# Patient Record
Sex: MALE | Race: WHITE | NOT HISPANIC OR LATINO | Employment: FULL TIME | URBAN - METROPOLITAN AREA
[De-identification: names, ages, dates, MRNs, and addresses within clinical notes are randomized per-mention and may not be internally consistent; named-entity substitution may affect disease eponyms.]

---

## 2022-07-22 PROBLEM — S83.242A ACUTE MEDIAL MENISCUS TEAR OF LEFT KNEE: Status: ACTIVE | Noted: 2022-07-22

## 2022-09-20 ENCOUNTER — PRE-ADMISSION TESTING (OUTPATIENT)
Dept: ADMISSIONS | Facility: MEDICAL CENTER | Age: 42
End: 2022-09-20
Attending: ORTHOPAEDIC SURGERY
Payer: COMMERCIAL

## 2022-09-20 VITALS — HEIGHT: 74 IN | BODY MASS INDEX: 29.53 KG/M2

## 2022-09-20 NOTE — PREPROCEDURE INSTRUCTIONS
"Preadmit appointment: \" Preparing for your Procedure information\" sheet given to patient with verbal and written instructions. Patient instructed to continue prescribed medications through the day before surgery, instructed to take the following medications the day of surgery per anesthesia protocol:  Tylenol PRN    Pt states, \"no issues with anesthesia\".  Anesthesia Fasting guidelines handout given to Pt, NPO at Midnight, prior to surgery and clear liquids up to 3 hours prior to surgery then strict NPO until surgery, clear liquids defined for Pt.        All Pt's questions and concerns answered or addressed.   "

## 2022-10-02 ENCOUNTER — ANESTHESIA EVENT (OUTPATIENT)
Dept: SURGERY | Facility: MEDICAL CENTER | Age: 42
End: 2022-10-02
Payer: COMMERCIAL

## 2022-10-03 ENCOUNTER — ANESTHESIA (OUTPATIENT)
Dept: SURGERY | Facility: MEDICAL CENTER | Age: 42
End: 2022-10-03
Payer: COMMERCIAL

## 2022-10-03 ENCOUNTER — HOSPITAL ENCOUNTER (OUTPATIENT)
Facility: MEDICAL CENTER | Age: 42
End: 2022-10-03
Attending: ORTHOPAEDIC SURGERY | Admitting: ORTHOPAEDIC SURGERY
Payer: COMMERCIAL

## 2022-10-03 VITALS
WEIGHT: 249.12 LBS | DIASTOLIC BLOOD PRESSURE: 90 MMHG | HEART RATE: 88 BPM | HEIGHT: 74 IN | SYSTOLIC BLOOD PRESSURE: 139 MMHG | BODY MASS INDEX: 31.97 KG/M2 | RESPIRATION RATE: 16 BRPM | TEMPERATURE: 97.2 F | OXYGEN SATURATION: 97 %

## 2022-10-03 DIAGNOSIS — G89.18 POST-OP PAIN: ICD-10-CM

## 2022-10-03 DIAGNOSIS — S83.242A ACUTE MEDIAL MENISCUS TEAR OF LEFT KNEE, INITIAL ENCOUNTER: ICD-10-CM

## 2022-10-03 PROCEDURE — 160048 HCHG OR STATISTICAL LEVEL 1-5: Performed by: ORTHOPAEDIC SURGERY

## 2022-10-03 PROCEDURE — 160046 HCHG PACU - 1ST 60 MINS PHASE II: Performed by: ORTHOPAEDIC SURGERY

## 2022-10-03 PROCEDURE — 700105 HCHG RX REV CODE 258: Performed by: ORTHOPAEDIC SURGERY

## 2022-10-03 PROCEDURE — 160035 HCHG PACU - 1ST 60 MINS PHASE I: Performed by: ORTHOPAEDIC SURGERY

## 2022-10-03 PROCEDURE — 700101 HCHG RX REV CODE 250: Performed by: ANESTHESIOLOGY

## 2022-10-03 PROCEDURE — 160025 RECOVERY II MINUTES (STATS): Performed by: ORTHOPAEDIC SURGERY

## 2022-10-03 PROCEDURE — 700111 HCHG RX REV CODE 636 W/ 250 OVERRIDE (IP): Performed by: ORTHOPAEDIC SURGERY

## 2022-10-03 PROCEDURE — 160002 HCHG RECOVERY MINUTES (STAT): Performed by: ORTHOPAEDIC SURGERY

## 2022-10-03 PROCEDURE — 160009 HCHG ANES TIME/MIN: Performed by: ORTHOPAEDIC SURGERY

## 2022-10-03 PROCEDURE — 29881 ARTHRS KNE SRG MNISECTMY M/L: CPT | Mod: LT | Performed by: ORTHOPAEDIC SURGERY

## 2022-10-03 PROCEDURE — 700111 HCHG RX REV CODE 636 W/ 250 OVERRIDE (IP): Performed by: ANESTHESIOLOGY

## 2022-10-03 PROCEDURE — 160039 HCHG SURGERY MINUTES - EA ADDL 1 MIN LEVEL 3: Performed by: ORTHOPAEDIC SURGERY

## 2022-10-03 PROCEDURE — 01400 ANES OPN/ARTHRS KNEE JT NOS: CPT | Performed by: ANESTHESIOLOGY

## 2022-10-03 PROCEDURE — 29881 ARTHRS KNE SRG MNISECTMY M/L: CPT | Mod: ASROC,LT | Performed by: ORTHOPAEDIC SURGERY

## 2022-10-03 PROCEDURE — 700101 HCHG RX REV CODE 250: Performed by: ORTHOPAEDIC SURGERY

## 2022-10-03 PROCEDURE — 160028 HCHG SURGERY MINUTES - 1ST 30 MINS LEVEL 3: Performed by: ORTHOPAEDIC SURGERY

## 2022-10-03 RX ORDER — ASPIRIN 81 MG/1
81 TABLET ORAL 2 TIMES DAILY
Qty: 60 TABLET | Refills: 0 | Status: SHIPPED | OUTPATIENT
Start: 2022-10-03 | End: 2022-11-02

## 2022-10-03 RX ORDER — GABAPENTIN 300 MG/1
300 CAPSULE ORAL ONCE
Status: DISCONTINUED | OUTPATIENT
Start: 2022-10-03 | End: 2022-10-03 | Stop reason: HOSPADM

## 2022-10-03 RX ORDER — HYDROMORPHONE HYDROCHLORIDE 1 MG/ML
0.2 INJECTION, SOLUTION INTRAMUSCULAR; INTRAVENOUS; SUBCUTANEOUS
Status: DISCONTINUED | OUTPATIENT
Start: 2022-10-03 | End: 2022-10-03 | Stop reason: HOSPADM

## 2022-10-03 RX ORDER — OXYCODONE HCL 5 MG/5 ML
5 SOLUTION, ORAL ORAL
Status: DISCONTINUED | OUTPATIENT
Start: 2022-10-03 | End: 2022-10-03 | Stop reason: HOSPADM

## 2022-10-03 RX ORDER — BUPIVACAINE HYDROCHLORIDE AND EPINEPHRINE 2.5; 5 MG/ML; UG/ML
INJECTION, SOLUTION EPIDURAL; INFILTRATION; INTRACAUDAL; PERINEURAL
Status: DISCONTINUED | OUTPATIENT
Start: 2022-10-03 | End: 2022-10-03 | Stop reason: HOSPADM

## 2022-10-03 RX ORDER — ACETAMINOPHEN 500 MG
1000 TABLET ORAL ONCE
Status: DISCONTINUED | OUTPATIENT
Start: 2022-10-03 | End: 2022-10-03 | Stop reason: HOSPADM

## 2022-10-03 RX ORDER — DIPHENHYDRAMINE HYDROCHLORIDE 50 MG/ML
12.5 INJECTION INTRAMUSCULAR; INTRAVENOUS
Status: DISCONTINUED | OUTPATIENT
Start: 2022-10-03 | End: 2022-10-03 | Stop reason: HOSPADM

## 2022-10-03 RX ORDER — HYDROMORPHONE HYDROCHLORIDE 1 MG/ML
0.4 INJECTION, SOLUTION INTRAMUSCULAR; INTRAVENOUS; SUBCUTANEOUS
Status: DISCONTINUED | OUTPATIENT
Start: 2022-10-03 | End: 2022-10-03 | Stop reason: HOSPADM

## 2022-10-03 RX ORDER — LIDOCAINE HYDROCHLORIDE 10 MG/ML
INJECTION, SOLUTION INFILTRATION; PERINEURAL
Status: DISCONTINUED | OUTPATIENT
Start: 2022-10-03 | End: 2022-10-03 | Stop reason: HOSPADM

## 2022-10-03 RX ORDER — ALBUTEROL SULFATE 2.5 MG/3ML
2.5 SOLUTION RESPIRATORY (INHALATION)
Status: DISCONTINUED | OUTPATIENT
Start: 2022-10-03 | End: 2022-10-03 | Stop reason: HOSPADM

## 2022-10-03 RX ORDER — OXYCODONE HCL 5 MG/5 ML
10 SOLUTION, ORAL ORAL
Status: DISCONTINUED | OUTPATIENT
Start: 2022-10-03 | End: 2022-10-03 | Stop reason: HOSPADM

## 2022-10-03 RX ORDER — HYDRALAZINE HYDROCHLORIDE 20 MG/ML
5 INJECTION INTRAMUSCULAR; INTRAVENOUS
Status: DISCONTINUED | OUTPATIENT
Start: 2022-10-03 | End: 2022-10-03 | Stop reason: HOSPADM

## 2022-10-03 RX ORDER — HYDROCODONE BITARTRATE AND ACETAMINOPHEN 5; 325 MG/1; MG/1
1-2 TABLET ORAL EVERY 6 HOURS PRN
Qty: 20 TABLET | Refills: 0 | Status: SHIPPED | OUTPATIENT
Start: 2022-10-03 | End: 2022-10-07

## 2022-10-03 RX ORDER — LIDOCAINE HYDROCHLORIDE 20 MG/ML
INJECTION, SOLUTION EPIDURAL; INFILTRATION; INTRACAUDAL; PERINEURAL PRN
Status: DISCONTINUED | OUTPATIENT
Start: 2022-10-03 | End: 2022-10-03 | Stop reason: SURG

## 2022-10-03 RX ORDER — DEXAMETHASONE SODIUM PHOSPHATE 4 MG/ML
INJECTION, SOLUTION INTRA-ARTICULAR; INTRALESIONAL; INTRAMUSCULAR; INTRAVENOUS; SOFT TISSUE PRN
Status: DISCONTINUED | OUTPATIENT
Start: 2022-10-03 | End: 2022-10-03 | Stop reason: SURG

## 2022-10-03 RX ORDER — ONDANSETRON 2 MG/ML
INJECTION INTRAMUSCULAR; INTRAVENOUS PRN
Status: DISCONTINUED | OUTPATIENT
Start: 2022-10-03 | End: 2022-10-03 | Stop reason: SURG

## 2022-10-03 RX ORDER — SODIUM CHLORIDE, SODIUM LACTATE, POTASSIUM CHLORIDE, CALCIUM CHLORIDE 600; 310; 30; 20 MG/100ML; MG/100ML; MG/100ML; MG/100ML
INJECTION, SOLUTION INTRAVENOUS CONTINUOUS
Status: DISCONTINUED | OUTPATIENT
Start: 2022-10-03 | End: 2022-10-03 | Stop reason: HOSPADM

## 2022-10-03 RX ORDER — LABETALOL HYDROCHLORIDE 5 MG/ML
5 INJECTION, SOLUTION INTRAVENOUS
Status: DISCONTINUED | OUTPATIENT
Start: 2022-10-03 | End: 2022-10-03 | Stop reason: HOSPADM

## 2022-10-03 RX ORDER — NAPROXEN 500 MG/1
500 TABLET ORAL 2 TIMES DAILY WITH MEALS
Qty: 10 TABLET | Refills: 0 | Status: SHIPPED | OUTPATIENT
Start: 2022-10-03 | End: 2022-10-08

## 2022-10-03 RX ORDER — ONDANSETRON 2 MG/ML
4 INJECTION INTRAMUSCULAR; INTRAVENOUS
Status: DISCONTINUED | OUTPATIENT
Start: 2022-10-03 | End: 2022-10-03 | Stop reason: HOSPADM

## 2022-10-03 RX ORDER — MORPHINE SULFATE 0.5 MG/ML
INJECTION, SOLUTION EPIDURAL; INTRATHECAL; INTRAVENOUS
Status: DISCONTINUED | OUTPATIENT
Start: 2022-10-03 | End: 2022-10-03 | Stop reason: HOSPADM

## 2022-10-03 RX ORDER — HYDROMORPHONE HYDROCHLORIDE 1 MG/ML
0.1 INJECTION, SOLUTION INTRAMUSCULAR; INTRAVENOUS; SUBCUTANEOUS
Status: DISCONTINUED | OUTPATIENT
Start: 2022-10-03 | End: 2022-10-03 | Stop reason: HOSPADM

## 2022-10-03 RX ORDER — HALOPERIDOL 5 MG/ML
1 INJECTION INTRAMUSCULAR
Status: DISCONTINUED | OUTPATIENT
Start: 2022-10-03 | End: 2022-10-03 | Stop reason: HOSPADM

## 2022-10-03 RX ORDER — CEFAZOLIN SODIUM 1 G/3ML
2 INJECTION, POWDER, FOR SOLUTION INTRAMUSCULAR; INTRAVENOUS ONCE
Status: COMPLETED | OUTPATIENT
Start: 2022-10-03 | End: 2022-10-03

## 2022-10-03 RX ORDER — ONDANSETRON 4 MG/1
4 TABLET, FILM COATED ORAL EVERY 4 HOURS PRN
Qty: 20 TABLET | Refills: 0 | Status: SHIPPED | OUTPATIENT
Start: 2022-10-03

## 2022-10-03 RX ORDER — HYDROMORPHONE HYDROCHLORIDE 2 MG/ML
INJECTION, SOLUTION INTRAMUSCULAR; INTRAVENOUS; SUBCUTANEOUS PRN
Status: DISCONTINUED | OUTPATIENT
Start: 2022-10-03 | End: 2022-10-03 | Stop reason: SURG

## 2022-10-03 RX ORDER — MIDAZOLAM HYDROCHLORIDE 1 MG/ML
INJECTION INTRAMUSCULAR; INTRAVENOUS PRN
Status: DISCONTINUED | OUTPATIENT
Start: 2022-10-03 | End: 2022-10-03 | Stop reason: SURG

## 2022-10-03 RX ORDER — CELECOXIB 200 MG/1
200 CAPSULE ORAL ONCE
Status: DISCONTINUED | OUTPATIENT
Start: 2022-10-03 | End: 2022-10-03 | Stop reason: HOSPADM

## 2022-10-03 RX ADMIN — ONDANSETRON 4 MG: 2 INJECTION INTRAMUSCULAR; INTRAVENOUS at 11:25

## 2022-10-03 RX ADMIN — CEFAZOLIN 2 G: 330 INJECTION, POWDER, FOR SOLUTION INTRAMUSCULAR; INTRAVENOUS at 11:25

## 2022-10-03 RX ADMIN — FENTANYL CITRATE 100 MCG: 50 INJECTION, SOLUTION INTRAMUSCULAR; INTRAVENOUS at 11:22

## 2022-10-03 RX ADMIN — SODIUM CHLORIDE, POTASSIUM CHLORIDE, SODIUM LACTATE AND CALCIUM CHLORIDE: 600; 310; 30; 20 INJECTION, SOLUTION INTRAVENOUS at 11:19

## 2022-10-03 RX ADMIN — HYDROMORPHONE HYDROCHLORIDE 1 MG: 2 INJECTION INTRAMUSCULAR; INTRAVENOUS; SUBCUTANEOUS at 11:25

## 2022-10-03 RX ADMIN — PROPOFOL 150 MCG/KG/MIN: 10 INJECTION, EMULSION INTRAVENOUS at 11:23

## 2022-10-03 RX ADMIN — PROPOFOL 200 MG: 10 INJECTION, EMULSION INTRAVENOUS at 11:22

## 2022-10-03 RX ADMIN — LIDOCAINE HYDROCHLORIDE 100 MG: 20 INJECTION, SOLUTION EPIDURAL; INFILTRATION; INTRACAUDAL at 11:22

## 2022-10-03 RX ADMIN — MIDAZOLAM HYDROCHLORIDE 2 MG: 1 INJECTION, SOLUTION INTRAMUSCULAR; INTRAVENOUS at 11:22

## 2022-10-03 RX ADMIN — DEXAMETHASONE SODIUM PHOSPHATE 4 MG: 4 INJECTION, SOLUTION INTRA-ARTICULAR; INTRALESIONAL; INTRAMUSCULAR; INTRAVENOUS; SOFT TISSUE at 11:25

## 2022-10-03 RX ADMIN — HYDROMORPHONE HYDROCHLORIDE 1 MG: 2 INJECTION INTRAMUSCULAR; INTRAVENOUS; SUBCUTANEOUS at 11:32

## 2022-10-03 ASSESSMENT — PAIN DESCRIPTION - PAIN TYPE
TYPE: SURGICAL PAIN

## 2022-10-03 ASSESSMENT — PAIN SCALES - GENERAL: PAIN_LEVEL: 3

## 2022-10-03 NOTE — OR NURSING
1243: To stage ll. Pain is tolerable w/o nausea.    1305: Home care instructions reviewed w/ pt  and mom. No questions, meets criteria for discharge.

## 2022-10-03 NOTE — ANESTHESIA POSTPROCEDURE EVALUATION
Patient: Cam Lane    Procedure Summary     Date: 10/03/22 Room / Location:  OR 02 / SURGERY [facility]    Anesthesia Start: 1119 Anesthesia Stop: 1157    Procedure: Left knee arthroscopy with partial medial meniscectomy (Left: Knee) Diagnosis:       Acute medial meniscus tear, left, initial encounter      (Acute medial meniscus tear, left, initial encounter [S83.242A])    Surgeons: Kita De Leon M.D. Responsible Provider: Wicho Stone M.D.    Anesthesia Type: general ASA Status: 2          Final Anesthesia Type: general  Last vitals  BP   Blood Pressure: (!) 149/94    Temp   36.8 °C (98.2 °F)    Pulse   89   Resp   19    SpO2   96 %      Anesthesia Post Evaluation    Patient location during evaluation: PACU  Patient participation: complete - patient participated  Level of consciousness: awake and alert  Pain score: 3    Airway patency: patent  Anesthetic complications: no  Cardiovascular status: hemodynamically stable  Respiratory status: acceptable  Hydration status: euvolemic    PONV: none          No notable events documented.     Nurse Pain Score: 0 (NPRS)

## 2022-10-03 NOTE — LETTER
August 2, 2022    Patient Name: Cam Lane  Surgeon Name: Kita De Leon M.D.  Surgery Facility: [facility] ([id number] [facility])  Surgery Date: 10/3/2022    The time of your surgery is not final and may change up to and until the day of your surgery. You will be contacted 24-48 hours prior to your surgery date with your check-in and surgery time.    If you will not be at one of the below numbers please call the surgery scheduler at [phone]  Preferred Phone: [phone]    BEFORE YOUR SURGERY   Pre Registration and/or Lab Work must be done within and no earlier than 28 days prior to your surgery date. Please call [facility] at [phone] for an appointment as soon as possible.    DAY OF YOUR SURGERY  Nothing to eat or drink after midnight     Refrain from smoking any substance after midnight prior to surgery. Smoking may interfere with the anesthetic and frequently produces nausea during the recovery period.    Continue taking all lifesaving medications. Including the morning of your surgery with small sip of water.    Please arrive at the hospital/surgery center at the check-in time provided.     An adult will need to bring you and take you home after your surgery.     AFTER YOUR SURGERY  Post op Appointment:   Date: 10.14.22   Time: 1:00 PM   With: Kita De Leon M.D.   Location: [address] [place] [id number]    - Therapy- Your first appointment should be 3  day(s) after your surgery. For your convenience we have 4 Physical Therapy locations: [facility], [place], and [facility]. Call our office ASAP to schedule an appointment at [phone] or take the enclosed Therapy Prescription to a facility of your choice.  - Post Surgery - You will need someone to drive you home         TIME OFF WORK  FMLA or Disability forms can be faxed directly to: [phone] or you may drop them off at [address] [place] [address]  AYDEE Howe [address]. Our office charges a $35.00 fee per form. Forms will be completed within 10 business days of drop off and payment received. For the status of your forms you may contact our disability office directly [phone].    MEDICATION INSTRUCTIONS **Please read section completely**    The following medications should be stopped a minimum of 10 days prior to surgery:  All over the counter, Supplements & Herbal medications    Anorectics: Phentermine (Adipex-P, Lomaira and Suprenza), Phentermine-topiramate (Qsymia), Bupropion-naltrexone (Contrave)    Opiod Partial Agonists/Opioid Antagonists: Buprenorphine (Subocone, Belbuca, Butrans, Probuphine Implant, Sublocade), Naltrexone (ReVia, Vivitrol), Naloxone    Amphetamines: Dextroamphetamine/Amphetamine (Adderall, Mydayis), Methylphenidate Hydrochloride (Concerta, Metadate, Methylin, Ritalin)    The following medications should be stopped 5 days prior to surgery:  Blood Thinners: Any Aspirin, Aspirin products, anti-inflammatories such as ibuprofen and any blood thinners such as Coumadin and Plavix. Please consult your prescribing physician if you are on life saving blood thinners, in regards to when to stop medications prior to surgery.     The following medications should be stopped a minimum of 3 days prior to surgery:  PDE-5 inhibitors: Sildenafil (Viagra), Tadalafil (Cialis), Vardenafil (Levitra), Avanafil (Stendra)    MAO Inhibitors: Rasagiline (Azilect), Selegiline (Eldepryl, Emsam, Selapar), Isocarboxazid (Marplan), Phenelzine (Nardil)    You can use All My Data to message your Care Team. Don't have a All My Data account? Sign up here:

## 2022-10-03 NOTE — DISCHARGE INSTRUCTIONS
ACTIVITY: Rest and take it easy for the first 24 hours.  A responsible adult is recommended to remain with you during that time.  It is normal to feel sleepy.  We encourage you to not do anything that requires balance, judgment or coordination.    MILD FLU-LIKE SYMPTOMS ARE NORMAL. YOU MAY EXPERIENCE GENERALIZED MUSCLE ACHES, THROAT IRRITATION, HEADACHE AND/OR SOME NAUSEA.    FOR 24 HOURS DO NOT:  Drive, operate machinery or run household appliances.  Drink beer or alcoholic beverages.   Make important decisions or sign legal documents.    SPECIAL INSTRUCTIONS: See packet provided by      DIET: To avoid nausea, slowly advance diet as tolerated, avoiding spicy or greasy foods for the first day.  Add more substantial food to your diet according to your physician's instructions.    INCREASE FLUIDS AND FIBER TO AVOID CONSTIPATION.      You should CALL YOUR PHYSICIAN if you develop:  Fever greater than 101 degrees F.  Pain not relieved by medication, or persistent nausea or vomiting.  Excessive bleeding (blood soaking through dressing) or unexpected drainage from the wound.  Extreme redness or swelling around the incision site, drainage of pus or foul smelling drainage.  Inability to urinate or empty your bladder within 8 hours.  Problems with breathing or chest pain.    You should call 911 if you develop problems with breathing or chest pain.  If you are unable to contact your doctor or surgical center, you should go to the nearest emergency room or urgent care center.  Physician's telephone #: [phone]    If any questions arise, call your doctor.  If your doctor is not available, please feel free to call the Surgical Center at [phone].     A registered nurse may call you a few days after your surgery to see how you are doing after your procedure.    MEDICATIONS: Resume taking daily medication.  Take prescribed pain medication with food.  If no medication is prescribed, you may take non-aspirin pain  medication if needed.  PAIN MEDICATION CAN BE VERY CONSTIPATING.  Take a stool softener or laxative such as senokot, pericolace, or milk of magnesia if needed.    Prescription given pre-op.  Last pain medication given at ________________.    If your physician has prescribed pain medication that includes Acetaminophen (Tylenol), do not take additional Acetaminophen (Tylenol) while taking the prescribed medication.

## 2022-10-03 NOTE — OR NURSING
1155: To PACU from OR via alberto, awake and verbally responsive. Respirations spontaneous and non-labored on 8L mask.  Icepack applied over c/d/i left knee surgical dressings. Left pedal pulse present with CMS intact. Denies pain and nausea.    1210: Pt remains awake, stable on 4L mask. Denies pain and nausea. Dressing remains c/d/I with ice pack in place.    1225: No change in surgical site assessment. Stable on RA. Meets criteria to transfer to Stage 2. Updated pt mom, Dianna, via phone.    1240: Dressing remains c/d/I with ice in place. States pain is tolerable at 2/10. Remains stable on RA.

## 2022-10-03 NOTE — ANESTHESIA PROCEDURE NOTES
Airway    Date/Time: 10/3/2022 11:23 AM  Performed by: Wicho Stone M.D.  Authorized by: Wicho Stone M.D.     Location:  OR  Urgency:  Elective  Difficult Airway: No    Indications for Airway Management:  Anesthesia      Spontaneous Ventilation: absent    Sedation Level:  Deep  Preoxygenated: Yes    Mask Difficulty Assessment:  0 - not attempted  Final Airway Type:  Supraglottic airway  Final Supraglottic Airway:  Standard LMA    SGA Size:  5  Number of Attempts at Approach:  1

## 2022-10-03 NOTE — ANESTHESIA TIME REPORT
Anesthesia Start and Stop Event Times     Date Time Event    10/3/2022 1113 Ready for Procedure     1119 Anesthesia Start     1157 Anesthesia Stop        Responsible Staff  10/03/22    Name Role Begin End    Wicho Stone M.D. Anesth 1119 1157        Overtime Reason:  no overtime (within assigned shift)    Comments:

## 2022-10-03 NOTE — OP REPORT
DATE OF SERVICE: October 3, 2022     PREOPERATIVE DIAGNOSES:  1.  Left knee medial meniscus tear.     POSTOPERATIVE DIAGNOSES:  1.  Left knee medial meniscus tear.  2.  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     PROCEDURE PERFORMED:  1.  Left knee diagnostic arthroscopy.  2.  Left knee partial medial meniscectomy.  3.  Left knee patella chondroplasty.     ATTENDING SURGEON: Kita De Leon MD    ANESTHESIA:  General.     ANESTHESIOLOGIST: Wicho Stone MD     ASSISTANT: Melvin Leon PA-C     SPECIMENS:  None.     ESTIMATED BLOOD LOSS:  Less than 5 mL     TOURNIQUET TIME:  None.     FINDINGS: There was a complex tear of the posterior horn of the medial meniscus with 2 large flaps flipped inferiorly.  The lateral meniscus, ACL, and PCL and cartilage within the medial and lateral compartments were intact.  He did have some grade I-II chondromalacia of the undersurface of the patella.     COMPLICATIONS:  None.     POST-OPERATIVE PLAN:  The patient will be weightbearing as tolerated and range of motion as tolerates on the left lower extremity.  The patient will start physical therapy in the next 2-3 days.  I will see the patient back in clinic in 7-10 days for suture removal.     INDICATIONS:  The patient is a very nice 42-year-old male who presented to clinic with worsening medial-sided left knee pain as well as mechanical symptoms of catching and locking.  The patient had tried and failed conservative management including physical therapy, activity modification, bracing, anti-inflammatories and a home exercise program and the pain persisted; therefore, the patient elected to proceed with surgery.  I had a long discussion with the patient regarding the risks and benefits of surgery, including but not limited to bleeding, infection, risk to surrounding structures such as blood vessels or nerves, pain, scar, reoperation, and risks of anesthesia, such as stroke, heart attack, deep venous thrombosis,  pulmonary embolism, and death.  The patient understood the risks and benefits and elected to proceed with surgery.     PROCEDURE IN DETAIL:  The patient was met in the preoperative hold area where the correct left lower extremity was marked with my initials.  The patient was then transported to the operating room where the patient was placed supine on the operating room table with all bony prominences well padded.  2g of preoperative Ancef was given.  The patient was intubated by the anesthesia team without complications.  Preoperative exam under anesthesia revealed the left knee with a mild knee effusion, full range of motion, 0-120 degrees.  The knee was stable to varus and valgus stressing.  There was a negative Lachman, negative posterior drawer.      The patient's left lower extremity was then prepped and draped in the usual sterile fashion.  A preoperative timeout was held with all those in the room agreed upon the correct patient, the correct site of surgery and the correct surgery to be performed.     I began the procedure by injecting a combination totaling 50 mL of 0.25% Marcaine with epinephrine and 1% lidocaine into the operative knee via the superior-lateral approach.  I then made a 1 cm vertical incision for my anterolateral portal using the 11 blade.  The scope was then inserted into the knee.  The patellofemoral joint showed grade I-II chondromalacia of the undersurface of the patella. There were no loose bodies in the medial or lateral gutter.  The knee was then placed in full extension with a valgus stress and I made my anterior medial portal under direct visualization with a spinal needle.  Probing the medial meniscus revealed a large tear of the posterior horn of the medial meniscus with 2 flaps at the root and posteriorly flipped inferiorly.  The tear involved about 20% of the posterior root.  I used a combination of an arthroscopic biter and a 4.0 mm  sucker shaver to debride the medial  meniscus back to a stable edge.  The knee  was then flexed and the cartilage throughout the medial compartment was intact.  The knee was then placed in 90 degrees and I was able to identify an intact and competent ACL and PCL.  The knee was then placed in figure-of-four position and the lateral meniscus was intact and stable to probing without tears.  The cartilage of the lateral compartment was pristine.  I then placed the knee back in full extension and performed a chondroplasty of the undersurface of the patella using the 4.0 mm sucker shaver to debride loose flaps of cartilage.  The knee was then copiously irrigated with arthroscopic fluid and I inserted a spinal needle under direct visualization via the superolateral aspect of the knee for placement of my postoperative pain injection.  The scope was then removed from the knee and the portals were closed with 3-0 Prolene suture.  A combination of Duramorph and 0.25% Marcaine with epinephrine was then injected to the knee via the previously placed spinal needle.  Sterile dressings were then applied to the knee and the patient was awoken from anesthesia without complications.  Please note that all counts were correct at the end of the case and Melvin Leon PA-C, was necessary and critical for all portions of the procedure including positioning, limb management, and visualization and without his help, the surgery would not have been as technically successful.            Kita De Leon MD

## 2022-10-03 NOTE — ANESTHESIA PREPROCEDURE EVALUATION
Case: [id number] Date/Time: 10/03/22 1215    Procedure: Left knee arthroscopy with partial medial meniscectomy, repairs as indicated (Left)    Anesthesia type: General    Diagnosis: Acute medial meniscus tear, left, initial encounter [S83.242A]    Pre-op diagnosis: Acute medial meniscus tear, left, initial encounter [S83.242A]    Location: [place] [address] / SURGERY [facility]    Surgeons: Kita De Leon M.D.          Relevant Problems   No relevant active problems       Physical Exam    Airway   Mallampati: II  TM distance: >3 FB  Neck ROM: full       Cardiovascular - normal exam  Rhythm: regular  Rate: normal  (-) murmur     Dental - normal exam        Facial Hair   Pulmonary - normal exam  Breath sounds clear to auscultation     Abdominal    Neurological - normal exam                 Anesthesia Plan    ASA 2       Plan - general       Airway plan will be LMA          Induction: intravenous    Postoperative Plan: Postoperative administration of opioids is intended.    Pertinent diagnostic labs and testing reviewed    Informed Consent:    Anesthetic plan and risks discussed with patient.    Use of blood products discussed with: patient whom consented to blood products.

## 2022-10-03 NOTE — H&P
Surgery Orthopedic History & Physical Note    Date  10/3/2022    Primary Care Physician  Yoandy Begum M.D.      Pre-Op Diagnosis Codes:     * Acute medial meniscus tear, left, initial encounter [S83.242A]    NICK Levy is a very pleasant and active 42-year-old male presents the clinic today for evaluation of ongoing left knee pain.  He states the symptoms began approximately 6 months ago.  He is unsure of any specific trauma or injury however he does state that after a long weekend in [facility] his symptoms worsened.  He points to the medial aspect of his knee is the most severe area of pain.  He does feel like his knee has a catching and buckling sensation.  He states that over the past several months he has attempted various forms of conservative management with only some slight relief of his symptoms including rest, ice, activity modifications, the use of anti-inflammatory medicines, and bracing.         History reviewed. No pertinent past medical history.    Past Surgical History:   Procedure Laterality Date    OTHER ABDOMINAL SURGERY  2017    Gal Bladder Removal    OTHER ORTHOPEDIC SURGERY  2005    Radial Malunion       Current Facility-Administered Medications   Medication Dose Route Frequency Provider Last Rate Last Admin    celecoxib (CELEBREX) capsule 200 mg  200 mg Oral Once Wicho Stone M.D.        acetaminophen (TYLENOL) tablet 1,000 mg  1,000 mg Oral Once Wicho Stone M.D.        gabapentin (NEURONTIN) capsule 300 mg  300 mg Oral Once Wicho Stone M.D.        ceFAZolin (ANCEF) injection 2 g  2 g Intravenous Once Kita De Leon M.D.        lidocaine (XYLOCAINE) 1 % injection 0.5 mL  0.5 mL Intradermal Once PRN Kita De Leon M.D.        lactated ringers infusion   Intravenous Continuous Kita De Leon M.D.           Social History     Socioeconomic History    Marital status:      Spouse name: Not on file    Number of children: Not on file    Years of education: Not on  file    Highest education level: Not on file   Occupational History    Not on file   Tobacco Use    Smoking status: Never    Smokeless tobacco: Former     Types: Chew     Quit date: 2017   Substance and Sexual Activity    Alcohol use: Yes     Alcohol/week: 6.0 oz     Types: 10 Cans of beer per week    Drug use: Yes     Types: Inhaled     Comment: I occasionally smoke Marijuana.    Sexual activity: Not on file   Other Topics Concern    Not on file   Social History Narrative    Not on file     Social Determinants of Health     Financial Resource Strain: Not on file   Food Insecurity: Not on file   Transportation Needs: Not on file   Physical Activity: Not on file   Stress: Not on file   Social Connections: Not on file   Intimate Partner Violence: Not on file   Housing Stability: Not on file       History reviewed. No pertinent family history.    Allergies  Patient has no known allergies.    Review of Systems  Negative except for L knee pain    Physical Exam    Vital Signs  Blood Pressure: (!) 149/94   Temperature: 36.8 °C (98.2 °F)   Pulse: 89   Respiration: 19   Pulse Oximetry: 96 %     GENERAL: A+Ox3. INAD. Well appearing and well groomed.  MENTAL STATUS: Pleasant and cooperative. Alert and oriented x3 with normal mood and affect.  Eyes: normal pupils  HNT: NCAT  Vascular: Pulses are palpable 2+, capillary refills to digits are normal.  Skin: No wounds/lesions/ulcers. Skin warm & dry.  Respiratory: No respiratory distress     MUSCULOSKELETAL:    Left knee:   Skin: Intact.  No lesions.  Effusion: Trace  Range of Motion: 0-120 degrees.  There is pain medially and deep flexion  Tenderness: Medial Joint Line: Tender to palpation;   Lateral Joint Line: Nontender palpation  Stable to Varus/Valgus stress.  Lachman: Normal.  Posterior Drawer: Normal.  Bridgette exam: Positive for pain, positive for click.  Thessaly: Positive  Patellar Grind: No patellar grind noted  Patellar Stability: No patellar instability appreciated.      Muscle Strength: R/L 5/5, R/L 5/5 in the anterior, posterior, lateral group muscles.  Compartments: are soft, no proximal calf tenderness.   SILT saphenous/sural/superficial peroneal/deep peroneal nerves        ==========     IMAGING FINDINGS: I personally reviewed the images independent of the radiology read.     Multiple radiographic views of the patient's left knee conducted at [facility] on 7/5/2020 reviewed by myself today.  These images demonstrate no obvious fracture or dislocation.     Noncontrast MRI of the patient's left knee conducted at [facility] on 7/22/2022 reviewed myself today.  These images demonstrate a tear of the posterior horn of the medial meniscus without disruption of the root attachment.     ==========     ASSESSMENT & PLAN: There is a very pleasant and active 42-year-old male who presents the clinic today for evaluation of ongoing left knee pain and mechanical symptoms with history and exam findings consistent with a diagnosis of a left knee medial meniscus tear.     -I discussed these findings at length with her today.  We discussed multiple treatment options.  Given the fact that he has attempted and failed various forms of conservative management over the past several months and continues to have persistent mechanical symptoms and pain that is affecting his daily life we did discuss surgery today.  I informed her that his best surgical option would be a left knee arthroscopy with a partial medial meniscectomy.  There are mutual decision-making elected to proceed with this option.        It was explained to the patient that any surgical procedure carries with it the risks of loss of limb or loss of life. Medical complications include but are not limited to death or disability from a heart attack, stroke, GI bleeding, thrombophlebitis and pulmonary embolism, sepsis, adverse reactions (death) due to blood transfusions, allergy or adverse drug reaction. There are other rare, unknown and  uncommon systemic conditions that could also adversely affect the systemic outcome. Local complications include but are not limited to wound dehiscence, deep infection, failure of fixation or reconstruction, damage to nerves and vessels which could be temporary or permanent, local recurrence as well as other rare, uncommon and unknown local complications that may necessitate re-operation, more complex orthopaedic reconstructions or amputation. The Patient was informed, questions were answered, and the consents were signed.  They understood the procedure and despite the risks decided to proceed with surgery.     Kita De Leon MD

## 2022-10-03 NOTE — LETTER
[facility] - INTRAPROCEDURE  [id number]  [facility] [id number]  [phone]     October 3, 2022    Patient: Cma Lane   YOB: 1980   Date of Visit: 10/3/2022       To Whom It May Concern:    Cam Lane underwent surgery on his left knee on October 3, 2022.  He is safe to return to work on October 5, 2022, but should be allowed to ice and elevate his left knee as needed for pain and swelling.  Please me know if you have any questions or concerns.    Sincerely,        Kita De Leon MD  NPI: [id number]

## 2022-10-03 NOTE — OR NURSING
Received patient from waiting room.  Patient prepped for surgery.  All questions answered.  Patient waiting comfortably with call light in reach.

## 2023-08-07 ENCOUNTER — APPOINTMENT (OUTPATIENT)
Dept: RADIOLOGY | Facility: MEDICAL CENTER | Age: 43
End: 2023-08-07
Attending: INTERNAL MEDICINE
Payer: COMMERCIAL

## (undated) DEVICE — DRAPE LARGE 3 QUARTER - (20/CA)

## (undated) DEVICE — WATER IRRIGATION STERILE 1000ML (12EA/CA)

## (undated) DEVICE — DRAPE SURGICAL U 77X120 - (10/CA)

## (undated) DEVICE — GLOVE BIOGEL SZ 7 SURGICAL PF LTX - (50PR/BX 4BX/CA)

## (undated) DEVICE — SODIUM CHL. IRRIGATION 0.9% 3000ML (4EA/CA 65CA/PF)

## (undated) DEVICE — SHAVER4.0 AGGRESSIVE + FORMLA (5EA/BX)

## (undated) DEVICE — CLOSURE SKIN STRIP 1/2 X 4 IN - (STERI STRIP) (50/BX 4BX/CA)

## (undated) DEVICE — TUBING DAY USE W/CARTRIDGE (10EA/BX)

## (undated) DEVICE — DRAPE LOWER EXTREMETY - (6/CA)

## (undated) DEVICE — PADDING CAST 6 IN STERILE - 6 X 4 YDS (24/CA)

## (undated) DEVICE — GLOVE BIOGEL SZ 8 SURGICAL PF LTX - (50PR/BX 4BX/CA)

## (undated) DEVICE — SODIUM CHL IRRIGATION 0.9% 1000ML (12EA/CA)

## (undated) DEVICE — SUCTION INSTRUMENT YANKAUER BULBOUS TIP W/O VENT (50EA/CA)

## (undated) DEVICE — TOWEL STOP TIMEOUT SAFETY FLAG (40EA/CA)

## (undated) DEVICE — SUTURE GENERAL

## (undated) DEVICE — CANISTER SUCTION RIGID RED 1500CC (40EA/CA)

## (undated) DEVICE — GLOVE BIOGEL PI INDICATOR SZ 8.0 SURGICAL PF LF -(50/BX 4BX/CA)

## (undated) DEVICE — BAG SPONGE COUNT 10.25 X 32 - BLUE (250/CA)

## (undated) DEVICE — PACK KNEE ARTHROSCOPY SM OR - (2EA/CA)

## (undated) DEVICE — CHLORAPREP 26 ML APPLICATOR - ORANGE TINT(25/CA)

## (undated) DEVICE — LACTATED RINGERS INJ 1000 ML - (14EA/CA 60CA/PF)

## (undated) DEVICE — SUTURE 3-0 PROLENE PS-1 (12PK/BX)

## (undated) DEVICE — GLOVE, LITE (PAIR)

## (undated) DEVICE — GOWN WARMING STANDARD FLEX - (30/CA)

## (undated) DEVICE — GLOVE BIOGEL PI INDICATOR SZ 7.0 SURGICAL PF LF - (50/BX 4BX/CA)

## (undated) DEVICE — TUBE CONNECTING SUCTION - CLEAR PLASTIC STERILE 72 IN (50EA/CA)

## (undated) DEVICE — HUMID-VENT HEAT AND MOISTURE EXCHANGE- (50/BX)

## (undated) DEVICE — SENSOR OXIMETER ADULT SPO2 RD SET (20EA/BX)